# Patient Record
Sex: MALE | Race: WHITE
[De-identification: names, ages, dates, MRNs, and addresses within clinical notes are randomized per-mention and may not be internally consistent; named-entity substitution may affect disease eponyms.]

---

## 2018-02-26 ENCOUNTER — HOSPITAL ENCOUNTER (EMERGENCY)
Dept: HOSPITAL 39 - ER | Age: 33
Discharge: HOME | End: 2018-02-26
Payer: COMMERCIAL

## 2018-02-26 VITALS — OXYGEN SATURATION: 96 % | DIASTOLIC BLOOD PRESSURE: 86 MMHG | TEMPERATURE: 98.7 F | SYSTOLIC BLOOD PRESSURE: 161 MMHG

## 2018-02-26 DIAGNOSIS — R50.9: Primary | ICD-10-CM

## 2018-02-26 DIAGNOSIS — B34.9: ICD-10-CM

## 2018-02-26 NOTE — ED.PDOC
History of Present Illness





- General


Chief Complaint: General


Stated Complaint: body aches /fever


Time Seen by Provider: 02/26/18 17:52


Source: patient


Exam Limitations: no limitations





- History of Present Illness


Initial Comments: 





Leonardo Joy 31 y/o male stated that he had generalized body aches since 

yesterday then today had fever,nasal congestion,headaches but no cough or achy 

throat.Denies chronic medical problems.No ill contact. Took Excedrin 3 hours ago


Timing/Duration: 24 hours


Severity: moderate


Improving Factors: nothing


Worsening Factors: nothing


Associated Symptoms: other - see hpi


Allergies/Adverse Reactions: 


Allergies





NO KNOWN ALLERGY Allergy (Verified 02/26/18 18:47)


 








Home Medications: 


Ambulatory Orders





Oseltamivir Capsule [Tamiflu] 75 mg PO BID 5 Days #10 capsule 02/26/18 











Review of Systems





- Review of Systems


Constitutional: States: see HPI, fever, malaise


EENTM: States: see HPI, nose congestion


Respiratory: States: no symptoms reported


Cardiology: States: no symptoms reported


Gastrointestinal/Abdominal: States: no symptoms reported


Genitourinary: States: no symptoms reported


Musculoskeletal: States: see HPI, muscle pain


Neurological: States: see HPI, headache


Endocrine: States: no symptoms reported


All other Systems: Reviewed and Negative, No Change from Baseline





Past Medical History (General)





- Patient Medical History


Hx Seizures: No


Hx Asthma: No


Hx Hypertension: No





- Social History


Hx Chewing Tobacco Use: No


Hx Alcohol Use: No


Hx Substance Use: No





Family Medical History





- Family History


  ** Mother


Family History: No Known





Physical Exam





- Physical Exam


General Appearance: Alert, Comfortable, No apparent distress


Eye Exam: bilateral normal


Ears, Nose, Throat: hearing grossly normal, normal ENT inspection


Neck: non-tender, full range of motion, supple


Respiratory: chest non-tender, lungs clear, normal breath sounds, decreased 

breath sounds


Cardiovascular/Chest: normal peripheral pulses, regular rate, rhythm


Peripheral Pulses: radial,right: 2+, radial,left: 2+


Gastrointestinal/Abdominal: normal bowel sounds, non tender, soft, no 

organomegaly


Back Exam: normal inspection, no CVA tenderness, no vertebral tenderness


Extremity: non-tender, no calf tenderness


Neurologic: no motor/sensory deficits, alert, oriented x 3


Skin Exam: normal color, warm/dry





Progress





- Progress


Progress: 





02/26/18 21:08


 Vital Signs - 8 hr











  02/26/18 02/26/18





  18:30 18:45


 


Temperature 98.9 F 


 


Pulse Rate [ 95 H 





left brachial]  


 


Respiratory 20 20





Rate  


 


Blood Pressure 148/93 





[left brachial]  


 


O2 Sat by Pulse 98 





Oximetry  














- Results/Orders


Results/Orders: 





 Laboratory Tests











  02/26/18 02/26/18





  18:53 19:14


 


WBC  4.8 


 


RBC  5.39 


 


Hgb  16.1 


 


Hct  47.1 


 


MCV  87.4 


 


MCH  29.9 


 


MCHC  34.2 


 


RDW  13.0 


 


Plt Count  208 


 


MPV  7.4 


 


Absolute Neuts (auto)  3.20 


 


Absolute Lymphs (auto)  1.00 


 


Absolute Monos (auto)  0.50 


 


Absolute Eos (auto)  0.00 


 


Absolute Basos (auto)  0.00 


 


Neutrophils %  67.8 


 


Lymphocytes %  20.0 


 


Monocytes %  10.7 H 


 


Eosinophils %  0.9 L 


 


Basophils %  0.6 


 


Sodium   136


 


Potassium   3.5 L


 


Chloride   102


 


Carbon Dioxide   30


 


Anion Gap   7.5 L


 


BUN   14


 


Creatinine   0.90


 


BUN/Creatinine Ratio   15.6


 


Random Glucose   96


 


Serum Osmolality   272.3 L


 


Calcium   9.1














Departure





- Departure


Clinical Impression: 


 Viral illness





Time of Disposition: 21:09


Disposition: Discharge to Home or Self Care


Condition: Good


Departure Forms:  ED Discharge - Pt. Copy, Patient Portal Self Enrollment


Instructions:  DI for Viral Syndrome, DI for Viral Upper Respiratory Infection -

- Adult


Diet: other - need to drink extra fluids


Prescriptions: 


Oseltamivir Capsule [Tamiflu] 75 mg PO BID 5 Days #10 capsule


Home Medications: 


Ambulatory Orders





Oseltamivir Capsule [Tamiflu] 75 mg PO BID 5 Days #10 capsule 02/26/18 








Additional Instructions: 


Continue with Tylenol 500 mg every 6 hours for fever/pain;Follow up with 

primary Md 02/28/2018 as needed

## 2020-05-12 NOTE — ED.PDOC
History of Present Illness





- General


Chief Complaint:  Problem


Stated Complaint: pain/swelling  testicle


Time Seen by Provider: 05/12/20 19:12


Source: patient, RN notes reviewed, Vital Signs reviewed, family - Wife


Exam Limitations: no limitations





- History of Present Illness


Initial Comments: 





Patient is a 35-year-old white male who presents with complaints of scrotal 

pain.  This started a few days ago and patient was hoping it would get better 

and so delayed coming in.  The pain in the scrotal area is stabbing in nature, 

moderate in intensity, nonradiating, worse with palpation, nothing seems to make

it better.  The patient is worsening.


Timing/Duration: getting worse, other - 3 to 4 days


Quality: moderate, sharpness, throbbing


Onset Location: scrotal


Radiation: none


Activites at Onset: none


Prior abdominal problems: none


Sexual intercourse history: less than 2 months ago


Improving Factors: nothing


Worsening Factors: other - Palpation


Associated Symptoms: denies symptoms


Allergies/Adverse Reactions: 


Allergies





NO KNOWN ALLERGY Allergy (Verified 02/26/18 18:47)


   





Home Medications: 


Ambulatory Orders





Oseltamivir Capsule [Tamiflu] 75 mg PO BID 5 Days #10 capsule 02/26/18 


Acetaminophen W/ Codeine [Tylenol W/ CODEINE #3] 1 ea PO Q4H #12  05/12/20 


Amoxicillin & Pot Clavulanate [Augmentin Tab] 875 mg PO BID #20 tab 05/12/20 











Review of Systems





- Review of Systems


Constitutional: States: no symptoms reported, see HPI.  Denies: chills, fever, 

malaise


EENTM: States: no symptoms reported


Respiratory: States: no symptoms reported.  Denies: cough, short of breath, 

stridor


Cardiology: States: no symptoms reported.  Denies: chest pain, palpitations, 

syncope


Gastrointestinal/Abdominal: States: no symptoms reported.  Denies: abdominal 

pain, diarrhea, nausea, vomiting


Genitourinary: States: see HPI, pain - Scrotal.  Denies: discharge, dysuria, 

frequency


Musculoskeletal: States: no symptoms reported.  Denies: back pain, neck pain


Skin: States: see HPI, lumps - At the base of the scrotum


Neurological: States: no symptoms reported.  Denies: tingling, tremors, weakness


Endocrine: Denies: increased hunger, increased thirst, increased urine


Hematologic/Lymphatic: States: no symptoms reported


All other Systems: Reviewed and Negative





Past Medical History (General)





- Patient Medical History


Hx Seizures: No


Hx Stroke: No


Hx Dementia: No


Hx Asthma: No


Hx of COPD: No


Hx Cardiac Disorders: No


Hx Congestive Heart Failure: No


Hx Pacemaker: No


Hx Hypertension: No


Hx Thyroid Disease: No


Hx Diabetes: No


Hx Gastroesophageal Reflux: No


Hx Renal Disease: No


Hx Cancer: No


Hx of HIV: No


Hx Hepatitis C: No


Hx MRSA: No


Surgical History: no surgical history





- Vaccination History


Hx Tetanus, Diphtheria Vaccination: No


Hx Influenza Vaccination: No


Hx Pneumococcal Vaccination: No


Immunizations Up to Date: No





- Social History


Hx Tobacco Use: No


Hx Chewing Tobacco Use: No


Hx Alcohol Use: Yes


Hx Substance Use: No


Hx Substance Use Treatment: No


Hx Depression: No


Feels Threatened In Home Enviroment: No


Feels Threatened In a Relationship: No


Hx Physical Abuse: No


Hx Emotional Abuse: No





- Activities of Daily Living


Hospice Agency (if applicable):: None





- Female History


Patient is a Female of Child Bearing Age (10 -59 yrs old): No





- Triage Comment


ED Triage Comment: pain started on friday on 5/8/20, denies any injury or trauma





Family Medical History





- Family History


  ** Mother


Family History: No Known





Physical Exam





- Physical Exam


General Appearance: Alert, Anxious, Obvious distress, Obese, Well Developed, 

Well Groomed, Well Hydrated, Well Nourished


Eyes, Ears, Nose, Throat Exam: PERRL/EOMI, normal ENT inspection, pharynx normal


Neck: non-tender, full range of motion, supple, normal inspection


Cardiovascular/Respiratory: no M/R/G, normal peripheral pulses, normal breath 

sounds, no respiratory distress, tachycardia


Gastrointestinal/Abdominal: normal bowel sounds, non tender, soft


Male Genital Exam: erythema - At the base of the scrotum where there is 

indurated tissue but no fluctuance.


Back Exam: normal inspection, no CVA tenderness, no vertebral tenderness


Extremity: normal range of motion, non-tender, normal inspection


Neurologic: CNs II-XII nml as tested, no motor/sensory deficits, normal 

mood/affect, oriented x 3


Skin Exam: normal color, warm/dry


Lymphatic: no adenopathy





Progress





- Progress


Progress: 


Differential diagnosis: Torsed testicle, UTI, sexually transmitted disease, 

Codi's gangrene among others.








05/12/20 21:01


Patient is much improved after pain medicine and IV antibiotics.  Plan on 

discharge home with a prescription for Augmentin and Tylenol 3.  I discussed 

this plan of care with the patient and his wife and they voiced understanding 

and agreement with the plan of care.





Kwabena Bautista M.D.


#[phone]














- Results/Orders


Results/Orders: 





                                        





05/12/20 19:22


IV Care:Saline Lock per Protoc QSHIFT 


Sodium Chloride 0.9% (Flush) [Saline Flush Syringe]   10 ml IV PRN PRN 


URINALYSIS Stat 








                         Laboratory Results - last 24 hr











  05/12/20 05/12/20





  19:40 19:40


 


WBC  12.7 H 


 


RBC  5.21 


 


Hgb  15.3 


 


Hct  44.9 


 


MCV  86.1 


 


MCH  29.4 


 


MCHC  34.2 


 


RDW  13.0 


 


Plt Count  264 


 


MPV  7.7 


 


Absolute Neuts (auto)  9.40 H 


 


Absolute Lymphs (auto)  2.10 


 


Absolute Monos (auto)  1.00 H 


 


Absolute Eos (auto)  0.20 


 


Absolute Basos (auto)  0.10 


 


Neutrophils %  74.2 


 


Lymphocytes %  16.2 L 


 


Monocytes %  7.9 


 


Eosinophils %  1.3 


 


Basophils %  0.4 


 


Sodium   137


 


Potassium   3.4 L


 


Chloride   103


 


Carbon Dioxide   25


 


Anion Gap   12.4


 


BUN   12


 


Creatinine   0.95


 


BUN/Creatinine Ratio   12.6


 


Random Glucose   100


 


Serum Osmolality   273.7 L


 


Calcium   8.9








                                   Vital Signs











  05/12/20 05/12/20 05/12/20





  18:53 19:51 20:00


 


Temperature 100.3 F H  


 


Pulse Rate [ 109 H 94 H 95 H





monitor]   


 


Respiratory 18 16 16





Rate   


 


Blood Pressure 145/111 145/94 162/92





[Left Arm]   


 


O2 Sat by Pulse 96 97 97





Oximetry   














Departure





- Departure


Clinical Impression: 


 Cellulitis of scrotum





Fever


Qualifiers:


 Encounter type: initial encounter 





Time of Disposition: 21:05


Disposition: Discharge to Home or Self Care


Condition: Good


Departure Forms:  ED Discharge - Pt. Copy, Patient Portal Self Enrollment


Instructions:  Cellulitis (Skin Infection), Adult (DC)


Diet: resume usual diet


Activity: increase activity as tolerated


Referrals: 


MILEY ARMSTRONG [Primary Care Provider] - 1-5 Days


Prescriptions: 


Acetaminophen W/ Codeine [Tylenol W/ CODEINE #3] 1 ea PO Q4H #12 


Amoxicillin & Pot Clavulanate [Augmentin Tab] 875 mg PO BID #20 tab


Home Medications: 


Ambulatory Orders





Oseltamivir Capsule [Tamiflu] 75 mg PO BID 5 Days #10 capsule 02/26/18 


Acetaminophen W/ Codeine [Tylenol W/ CODEINE #3] 1 ea PO Q4H #12  05/12/20 


Amoxicillin & Pot Clavulanate [Augmentin Tab] 875 mg PO BID #20 tab 05/12/20